# Patient Record
Sex: FEMALE | Race: WHITE | NOT HISPANIC OR LATINO | Employment: PART TIME | URBAN - METROPOLITAN AREA
[De-identification: names, ages, dates, MRNs, and addresses within clinical notes are randomized per-mention and may not be internally consistent; named-entity substitution may affect disease eponyms.]

---

## 2022-01-14 ENCOUNTER — TELEPHONE (OUTPATIENT)
Dept: SCHEDULING | Facility: IMAGING CENTER | Age: 62
End: 2022-01-14

## 2022-02-03 NOTE — PROGRESS NOTES
Subjective:     CC: Establish care    HISTORY OF THE PRESENT ILLNESS: Patient is a 62 y.o. female. This pleasant patient is here today to establish care and discuss the following issues:    The patient reports bilateral shoulder pain in the mornings and at night.  She works at an [employer] center and does a lot of repetitive activity.  She also reports a painful bump on her left heel over the last month.  She is required to wear steel toed boots at her job.  She also reports  discolorization and thickening of her big toe. This patient states she has smoked 1 pack/day x 43 years.  She began around age 17 or 18.  She is possibly interested in trying to quit, she has tried to stop smoking previously.      Allergies: Patient has no allergy information on record.    Current Outpatient Medications Ordered in Epic   Medication Sig Dispense Refill   • ciclopirox (PENLAC) 8 % solution Apply to affected nails and adjacent skin daily. Remove with alcohol every 7 days. 6.6 mL 3     No current [facility]-ordered facility-administered medications on file.       History reviewed. No pertinent past medical history.    Past Surgical History:   Procedure Laterality Date   • SALPINGO-OOPHORECTOMY, UNILATERAL Right 1985       Social History     Tobacco Use   • Smoking status: Current Every Day Smoker     Packs/day: 0.50   • Smokeless tobacco: Never Used   • Tobacco comment: 0.5-1 pack a day    Vaping Use   • Vaping Use: Never used   Substance Use Topics   • Alcohol use: Yes     Comment: occ.    • Drug use: Never       Social History     Social History Narrative   • Not on file       Family History   Problem Relation Age of Onset   • Rheumatologic Disease Mother    • Diabetes Father        Health Maintenance: Completed    ROS:   Gen: no fevers/chills  Pulm: no sob, no cough  CV: no chest pain, no palpitations  GI: no nausea/vomiting, no diarrhea  Neuro: no headaches, no numbness/tingling      Objective:     Exam: /68   Pulse  "71   Temp 36.4 °C (97.6 °F) (Temporal)   Resp 18   Ht 1.705 m (5' 7.13\")   Wt 101 kg (223 lb)   SpO2 93%  Body mass index is 34.8 kg/m².    General: Normal appearing. No distress.  HEENT: Normocephalic. Eyes conjunctiva clear lids without ptosis, pupils equal and reactive to light accommodation, oropharynx is without erythema, edema or exudates.   Pulmonary: Clear to ausculation.  Normal effort. No rales, ronchi, or wheezing.  Cardiovascular: Regular rate and rhythm without murmur.   Abdomen: Soft, nontender, nondistended.   Musculoskeletal: No extremity cyanosis, clubbing, or edema.  Psych: Normal mood and affect. Alert and oriented x3. Judgment and insight is normal.    Assessment & Plan:   62 y.o. female with the following -        Chronic pain of both shoulders  This is a chronic medical condition.  Ongoing.  The patient likely has chronic tendinitis due to the repetitive nature of her work.  She also may have a component of osteoarthritis, so we will check x-rays.  We will also screen for autoimmune arthritis.  - CCP ANTIBODY; Future  - RHEUMATOID ARTHRITIS FACTOR; Future  - DX-SHOULDER 2+ LEFT; Future  - DX-SHOULDER 2+ RIGHT; Future    Pain of right heel  This is a chronic medical condition.  Ongoing.  The patient reports a painful bump on her left heel.  The patient likely has a bone spur.  - DX-OS CALCIS (HEEL) 2+ RIGHT; Future  - Referral to Podiatry    Onychomycosis  This is a chronic medical condition.  The patient reports thickening and discoloration of her big toe.  Exam is consistent with onychomycoses.  - ciclopirox (PENLAC) 8 % solution; Apply to affected nails and adjacent skin daily. Remove with alcohol every 7 days.  Dispense: 6.6 mL; Refill: 3    Current smoker  This is a chronic medical condition.  Ongoing.  The patient states she has smoked 1 pack/day x 43 years.  She began around age 17 or 18.  She is possibly interested in trying to quit, she has tried to stop smoking previously.  We " will address this topic at her follow-up visit.  She will also be a candidate for the CT screening program.    Screening for deficiency anemia  - CBC WITH DIFFERENTIAL; Future    Encounter for screening for diabetes mellitus  - Comp Metabolic Panel; Future  - HEMOGLOBIN A1C; Future    Screening for cardiovascular condition  - Lipid Profile; Future    Thyroid disorder screen  - TSH WITH REFLEX TO FT4; Future    Encounter for screening mammogram for malignant neoplasm of breast  - MA-SCREENING MAMMO BILAT W/TOMOSYNTHESIS W/CAD; Future    Colon cancer screening  - COLOGUARD COLON CANCER SCREENING    Return in about 4 weeks (around 3/4/2022) for f/u labs.    Please note that this dictation was created using voice recognition software. I have made every reasonable attempt to correct obvious errors, but I expect that there are errors of grammar and possibly content that I did not discover before finalizing the note.

## 2022-02-04 ENCOUNTER — OFFICE VISIT (OUTPATIENT)
Dept: MEDICAL GROUP | Facility: PHYSICIAN GROUP | Age: 62
End: 2022-02-04
Payer: COMMERCIAL

## 2022-02-04 VITALS
TEMPERATURE: 97.6 F | WEIGHT: 223 LBS | DIASTOLIC BLOOD PRESSURE: 68 MMHG | BODY MASS INDEX: 35 KG/M2 | HEIGHT: 67 IN | RESPIRATION RATE: 18 BRPM | HEART RATE: 71 BPM | OXYGEN SATURATION: 93 % | SYSTOLIC BLOOD PRESSURE: 126 MMHG

## 2022-02-04 DIAGNOSIS — Z13.1 ENCOUNTER FOR SCREENING FOR DIABETES MELLITUS: ICD-10-CM

## 2022-02-04 DIAGNOSIS — G89.29 CHRONIC PAIN OF BOTH SHOULDERS: ICD-10-CM

## 2022-02-04 DIAGNOSIS — M25.512 CHRONIC PAIN OF BOTH SHOULDERS: ICD-10-CM

## 2022-02-04 DIAGNOSIS — Z13.29 THYROID DISORDER SCREEN: ICD-10-CM

## 2022-02-04 DIAGNOSIS — B35.1 ONYCHOMYCOSIS: ICD-10-CM

## 2022-02-04 DIAGNOSIS — Z13.0 SCREENING FOR DEFICIENCY ANEMIA: ICD-10-CM

## 2022-02-04 DIAGNOSIS — M79.671 PAIN OF RIGHT HEEL: ICD-10-CM

## 2022-02-04 DIAGNOSIS — Z12.31 ENCOUNTER FOR SCREENING MAMMOGRAM FOR MALIGNANT NEOPLASM OF BREAST: ICD-10-CM

## 2022-02-04 DIAGNOSIS — F17.200 CURRENT SMOKER: ICD-10-CM

## 2022-02-04 DIAGNOSIS — Z12.11 COLON CANCER SCREENING: ICD-10-CM

## 2022-02-04 DIAGNOSIS — Z13.6 SCREENING FOR CARDIOVASCULAR CONDITION: ICD-10-CM

## 2022-02-04 DIAGNOSIS — M25.511 CHRONIC PAIN OF BOTH SHOULDERS: ICD-10-CM

## 2022-02-04 PROCEDURE — 99204 OFFICE O/P NEW MOD 45 MIN: CPT | Performed by: INTERNAL MEDICINE

## 2022-02-04 RX ORDER — CICLOPIROX 80 MG/ML
SOLUTION TOPICAL
Qty: 6.6 ML | Refills: 3 | Status: SHIPPED | OUTPATIENT
Start: 2022-02-04

## 2022-02-04 ASSESSMENT — PATIENT HEALTH QUESTIONNAIRE - PHQ9: CLINICAL INTERPRETATION OF PHQ2 SCORE: 0

## 2022-02-08 PROBLEM — G89.29 CHRONIC PAIN OF BOTH SHOULDERS: Status: ACTIVE | Noted: 2022-02-08

## 2022-02-08 PROBLEM — B35.1 ONYCHOMYCOSIS: Status: ACTIVE | Noted: 2022-02-08

## 2022-02-08 PROBLEM — M25.512 CHRONIC PAIN OF BOTH SHOULDERS: Status: ACTIVE | Noted: 2022-02-08

## 2022-02-08 PROBLEM — F17.200 CURRENT SMOKER: Status: ACTIVE | Noted: 2022-02-08

## 2022-02-08 PROBLEM — M79.671 PAIN OF RIGHT HEEL: Status: ACTIVE | Noted: 2022-02-08

## 2022-02-08 PROBLEM — M25.511 CHRONIC PAIN OF BOTH SHOULDERS: Status: ACTIVE | Noted: 2022-02-08

## 2022-02-10 ENCOUNTER — HOSPITAL ENCOUNTER (OUTPATIENT)
Dept: LAB | Facility: MEDICAL CENTER | Age: 62
End: 2022-02-10
Attending: INTERNAL MEDICINE
Payer: COMMERCIAL

## 2022-02-10 ENCOUNTER — HOSPITAL ENCOUNTER (OUTPATIENT)
Dept: RADIOLOGY | Facility: MEDICAL CENTER | Age: 62
End: 2022-02-10
Attending: INTERNAL MEDICINE
Payer: COMMERCIAL

## 2022-02-10 DIAGNOSIS — M25.512 CHRONIC PAIN OF BOTH SHOULDERS: ICD-10-CM

## 2022-02-10 DIAGNOSIS — Z13.1 ENCOUNTER FOR SCREENING FOR DIABETES MELLITUS: ICD-10-CM

## 2022-02-10 DIAGNOSIS — Z13.29 THYROID DISORDER SCREEN: ICD-10-CM

## 2022-02-10 DIAGNOSIS — M25.511 CHRONIC PAIN OF BOTH SHOULDERS: ICD-10-CM

## 2022-02-10 DIAGNOSIS — G89.29 CHRONIC PAIN OF BOTH SHOULDERS: ICD-10-CM

## 2022-02-10 DIAGNOSIS — Z13.0 SCREENING FOR DEFICIENCY ANEMIA: ICD-10-CM

## 2022-02-10 DIAGNOSIS — Z13.6 SCREENING FOR CARDIOVASCULAR CONDITION: ICD-10-CM

## 2022-02-10 DIAGNOSIS — M79.671 PAIN OF RIGHT HEEL: ICD-10-CM

## 2022-02-10 LAB
ALBUMIN SERPL BCP-MCNC: 4.8 G/DL (ref 3.2–4.9)
ALBUMIN/GLOB SERPL: 1.8 G/DL
ALP SERPL-CCNC: 87 U/L (ref 30–99)
ALT SERPL-CCNC: 15 U/L (ref 2–50)
ANION GAP SERPL CALC-SCNC: 11 MMOL/L (ref 7–16)
AST SERPL-CCNC: 22 U/L (ref 12–45)
BASOPHILS # BLD AUTO: 1.2 % (ref 0–1.8)
BASOPHILS # BLD: 0.08 K/UL (ref 0–0.12)
BILIRUB SERPL-MCNC: 0.4 MG/DL (ref 0.1–1.5)
BUN SERPL-MCNC: 12 MG/DL (ref 8–22)
CALCIUM SERPL-MCNC: 9.5 MG/DL (ref 8.5–10.5)
CHLORIDE SERPL-SCNC: 103 MMOL/L (ref 96–112)
CHOLEST SERPL-MCNC: 220 MG/DL (ref 100–199)
CO2 SERPL-SCNC: 25 MMOL/L (ref 20–33)
CREAT SERPL-MCNC: 0.72 MG/DL (ref 0.5–1.4)
EOSINOPHIL # BLD AUTO: 0.28 K/UL (ref 0–0.51)
EOSINOPHIL NFR BLD: 4.2 % (ref 0–6.9)
ERYTHROCYTE [DISTWIDTH] IN BLOOD BY AUTOMATED COUNT: 43.3 FL (ref 35.9–50)
EST. AVERAGE GLUCOSE BLD GHB EST-MCNC: 126 MG/DL
FASTING STATUS PATIENT QL REPORTED: NORMAL
GLOBULIN SER CALC-MCNC: 2.7 G/DL (ref 1.9–3.5)
GLUCOSE SERPL-MCNC: 105 MG/DL (ref 65–99)
HBA1C MFR BLD: 6 % (ref 4–5.6)
HCT VFR BLD AUTO: 45.1 % (ref 37–47)
HDLC SERPL-MCNC: 40 MG/DL
HGB BLD-MCNC: 15 G/DL (ref 12–16)
IMM GRANULOCYTES # BLD AUTO: 0.01 K/UL (ref 0–0.11)
IMM GRANULOCYTES NFR BLD AUTO: 0.1 % (ref 0–0.9)
LDLC SERPL CALC-MCNC: 160 MG/DL
LYMPHOCYTES # BLD AUTO: 2.81 K/UL (ref 1–4.8)
LYMPHOCYTES NFR BLD: 41.9 % (ref 22–41)
MCH RBC QN AUTO: 30.3 PG (ref 27–33)
MCHC RBC AUTO-ENTMCNC: 33.3 G/DL (ref 33.6–35)
MCV RBC AUTO: 91.1 FL (ref 81.4–97.8)
MONOCYTES # BLD AUTO: 0.5 K/UL (ref 0–0.85)
MONOCYTES NFR BLD AUTO: 7.5 % (ref 0–13.4)
NEUTROPHILS # BLD AUTO: 3.03 K/UL (ref 2–7.15)
NEUTROPHILS NFR BLD: 45.1 % (ref 44–72)
NRBC # BLD AUTO: 0 K/UL
NRBC BLD-RTO: 0 /100 WBC
PLATELET # BLD AUTO: 327 K/UL (ref 164–446)
PMV BLD AUTO: 10.2 FL (ref 9–12.9)
POTASSIUM SERPL-SCNC: 4.1 MMOL/L (ref 3.6–5.5)
PROT SERPL-MCNC: 7.5 G/DL (ref 6–8.2)
RBC # BLD AUTO: 4.95 M/UL (ref 4.2–5.4)
RHEUMATOID FACT SER IA-ACNC: <10 IU/ML (ref 0–14)
SODIUM SERPL-SCNC: 139 MMOL/L (ref 135–145)
TRIGL SERPL-MCNC: 102 MG/DL (ref 0–149)
TSH SERPL DL<=0.005 MIU/L-ACNC: 1.68 UIU/ML (ref 0.38–5.33)
WBC # BLD AUTO: 6.7 K/UL (ref 4.8–10.8)

## 2022-02-10 PROCEDURE — 85025 COMPLETE CBC W/AUTO DIFF WBC: CPT

## 2022-02-10 PROCEDURE — 73030 X-RAY EXAM OF SHOULDER: CPT | Mod: LT

## 2022-02-10 PROCEDURE — 73650 X-RAY EXAM OF HEEL: CPT | Mod: RT

## 2022-02-10 PROCEDURE — 80061 LIPID PANEL: CPT

## 2022-02-10 PROCEDURE — 86200 CCP ANTIBODY: CPT

## 2022-02-10 PROCEDURE — 36415 COLL VENOUS BLD VENIPUNCTURE: CPT

## 2022-02-10 PROCEDURE — 83036 HEMOGLOBIN GLYCOSYLATED A1C: CPT

## 2022-02-10 PROCEDURE — 86431 RHEUMATOID FACTOR QUANT: CPT

## 2022-02-10 PROCEDURE — 73030 X-RAY EXAM OF SHOULDER: CPT | Mod: RT

## 2022-02-10 PROCEDURE — 80053 COMPREHEN METABOLIC PANEL: CPT

## 2022-02-10 PROCEDURE — 84443 ASSAY THYROID STIM HORMONE: CPT

## 2022-02-11 LAB — CCP IGG SERPL-ACNC: 3 UNITS (ref 0–19)

## 2022-03-03 ENCOUNTER — OFFICE VISIT (OUTPATIENT)
Dept: MEDICAL GROUP | Facility: PHYSICIAN GROUP | Age: 62
End: 2022-03-03
Payer: COMMERCIAL

## 2022-03-03 VITALS
OXYGEN SATURATION: 94 % | DIASTOLIC BLOOD PRESSURE: 78 MMHG | HEIGHT: 67 IN | SYSTOLIC BLOOD PRESSURE: 116 MMHG | WEIGHT: 224 LBS | HEART RATE: 60 BPM | TEMPERATURE: 96.8 F | BODY MASS INDEX: 35.16 KG/M2 | RESPIRATION RATE: 20 BRPM

## 2022-03-03 DIAGNOSIS — M25.511 CHRONIC PAIN OF BOTH SHOULDERS: ICD-10-CM

## 2022-03-03 DIAGNOSIS — R73.01 ELEVATED FASTING GLUCOSE: ICD-10-CM

## 2022-03-03 DIAGNOSIS — F17.200 CURRENT SMOKER: ICD-10-CM

## 2022-03-03 DIAGNOSIS — E78.2 MIXED HYPERLIPIDEMIA: ICD-10-CM

## 2022-03-03 DIAGNOSIS — M25.512 CHRONIC PAIN OF BOTH SHOULDERS: ICD-10-CM

## 2022-03-03 DIAGNOSIS — M79.641 PAIN IN BOTH HANDS: ICD-10-CM

## 2022-03-03 DIAGNOSIS — M79.642 PAIN IN BOTH HANDS: ICD-10-CM

## 2022-03-03 DIAGNOSIS — G89.29 CHRONIC PAIN OF BOTH SHOULDERS: ICD-10-CM

## 2022-03-03 DIAGNOSIS — M77.50 BONE SPUR OF FOOT: ICD-10-CM

## 2022-03-03 PROCEDURE — 99214 OFFICE O/P EST MOD 30 MIN: CPT | Performed by: INTERNAL MEDICINE

## 2022-03-03 ASSESSMENT — FIBROSIS 4 INDEX: FIB4 SCORE: 1.06

## 2022-03-03 NOTE — LETTER
March 3, 2022         Patient: Serenity Limon   YOB: 1960   Date of Visit: 3/3/2022           To Whom it May Concern:    Serenity Limon was seen in my clinic on 3/3/2022. She should be excused from the required composite toe work boots due to a medical condition that is worsened by wearing them.    If you have any questions or concerns, please don't hesitate to call.        Sincerely,           Rina Fernandez M.D.  Electronically Signed

## 2022-03-03 NOTE — PROGRESS NOTES
"Subjective:     CC:   Chief Complaint   Patient presents with   • Lab Results   • Results         HPI:   Serenity presents today to discuss the following issues:    The patient presents to follow-up on lab results.  She reports bilateral hand pain, but in particular, tenderness over her right second MCP joint with a palpable nodule.  She also reports continued heel pain, aggravated by her composite work boots.  She is requesting a letter excusing her from the requirement of wearing those boots.  She reports her shoulder pain is improving.    History reviewed. No pertinent past medical history.    Social History     Tobacco Use   • Smoking status: Current Every Day Smoker     Packs/day: 0.50   • Smokeless tobacco: Never Used   • Tobacco comment: 0.5-1 pack a day    Vaping Use   • Vaping Use: Never used   Substance Use Topics   • Alcohol use: Yes     Comment: occ.    • Drug use: Never       Current Outpatient Medications Ordered in Epic   Medication Sig Dispense Refill   • ciclopirox (PENLAC) 8 % solution Apply to affected nails and adjacent skin daily. Remove with alcohol every 7 days. 6.6 mL 3     No current [facility]-ordered facility-administered medications on file.       Allergies:  Patient has no allergy information on record.    Health Maintenance: Completed    ROS:   Denies any recent fevers or chills. No nausea or vomiting. No chest pains or shortness of breath.      Objective:       Exam:  /78 (BP Location: Left arm, Patient Position: Sitting, BP Cuff Size: Large adult)   Pulse 60   Temp 36 °C (96.8 °F) (Temporal)   Resp 20   Ht 1.702 m (5' 7\")   Wt 102 kg (224 lb)   SpO2 94%   Breastfeeding No   BMI 35.08 kg/m²  Body mass index is 35.08 kg/m².    Gen: Alert and oriented, No apparent distress.  Lungs: Normal effort, CTA bilaterally, no wheezes, rhonchi, or rales  CV: Regular rate and rhythm. No murmurs, rubs, or gallops.  Ext: TTP and palpable nodule over the right second MCP joint        Assessment & " Plan:     62 y.o. female with the following -     Pain in both hands  This is an acute medical condition.  The patient reports bilateral hand pain, but in particular, tenderness over her right second MCP joint with a palpable nodule, possibly consistent with a neuroma.  - DX-HAND 3+ RIGHT; Future  - DX-HAND 3+ LEFT; Future    Mixed hyperlipidemia  This is a chronic medical condition.  Stable.  The patient is diet controlled.  Lipid panel from 2/10/2022 showed a total cholesterol 220, , HDL 40, triglycerides 102. The 10-year ASCVD risk score (Vansant CORI Jr., et al., 2013) is: 9.4%  -Trial of dietary management with a low-cholesterol diet, repeat labs, treat if indicated  - Comp Metabolic Panel; Future  - Lipid Profile; Future    Elevated fasting glucose  This is a chronic medical condition.  Stable.  The patient is diet controlled.  Labs from 2/10/2022 showed an elevated fasting glucose of 105 with an elevated hemoglobin A1c of 6.  - Comp Metabolic Panel; Future  - HEMOGLOBIN A1C; Future    Current smoker  This is a chronic medical condition.  Ongoing.  The patient states she has smoked 1 pack/day x 43 years.  She began around age 17 or 18.  She is possibly interested in trying to quit, she has tried to stop smoking previously.  We will address this topic at her follow-up visit.  She will also be a candidate for the CT screening program.    Chronic pain of both shoulders  This is a chronic medical condition.  Improved.  The patient likely has chronic tendinitis due to the repetitive nature of her work.  Bilateral shoulder x-rays on 2/10/2022 showed osteoarthritis in the AC joint.  Rheumatoid factor was negative at < 10, CCP antibodies were 3.     Bone spur of foot  This is a chronic medical condition.  The patient has chronic and progressive right heel pain. Right heel x-ray on 2/10/2022 showed prominent calcaneal spurs.  The patient was referred to podiatry at our last visit, appointment pending.      Return in  about 3 months (around 6/3/2022) for f/u labs.    Please note that this dictation was created using voice recognition software. I have made every reasonable attempt to correct obvious errors, but I expect that there are errors of grammar and possibly content that I did not discover before finalizing the note.

## 2022-03-25 ENCOUNTER — HOSPITAL ENCOUNTER (OUTPATIENT)
Dept: RADIOLOGY | Facility: MEDICAL CENTER | Age: 62
End: 2022-03-25
Attending: INTERNAL MEDICINE
Payer: COMMERCIAL

## 2022-03-25 DIAGNOSIS — M79.641 PAIN IN BOTH HANDS: ICD-10-CM

## 2022-03-25 DIAGNOSIS — M79.642 PAIN IN BOTH HANDS: ICD-10-CM

## 2022-03-25 PROCEDURE — 73130 X-RAY EXAM OF HAND: CPT | Mod: RT

## 2022-03-25 PROCEDURE — 73130 X-RAY EXAM OF HAND: CPT | Mod: LT

## 2022-04-14 ENCOUNTER — HOSPITAL ENCOUNTER (OUTPATIENT)
Dept: RADIOLOGY | Facility: MEDICAL CENTER | Age: 62
End: 2022-04-14
Attending: INTERNAL MEDICINE
Payer: COMMERCIAL

## 2022-04-14 DIAGNOSIS — Z12.31 ENCOUNTER FOR SCREENING MAMMOGRAM FOR MALIGNANT NEOPLASM OF BREAST: ICD-10-CM

## 2022-04-14 PROCEDURE — 77063 BREAST TOMOSYNTHESIS BI: CPT

## 2022-04-22 ENCOUNTER — HOSPITAL ENCOUNTER (OUTPATIENT)
Dept: RADIOLOGY | Facility: MEDICAL CENTER | Age: 62
End: 2022-04-22
Attending: INTERNAL MEDICINE
Payer: COMMERCIAL

## 2022-04-22 DIAGNOSIS — R92.8 ABNORMAL MAMMOGRAM: ICD-10-CM

## 2022-04-22 PROCEDURE — 76642 ULTRASOUND BREAST LIMITED: CPT | Mod: RT

## 2022-04-22 PROCEDURE — G0279 TOMOSYNTHESIS, MAMMO: HCPCS

## 2022-07-28 ENCOUNTER — HOSPITAL ENCOUNTER (OUTPATIENT)
Dept: LAB | Facility: MEDICAL CENTER | Age: 62
End: 2022-07-28
Attending: INTERNAL MEDICINE
Payer: COMMERCIAL

## 2022-07-28 DIAGNOSIS — E78.2 MIXED HYPERLIPIDEMIA: ICD-10-CM

## 2022-07-28 DIAGNOSIS — R73.01 ELEVATED FASTING GLUCOSE: ICD-10-CM

## 2022-07-28 LAB
ALBUMIN SERPL BCP-MCNC: 4.3 G/DL (ref 3.2–4.9)
ALBUMIN/GLOB SERPL: 1.7 G/DL
ALP SERPL-CCNC: 79 U/L (ref 30–99)
ALT SERPL-CCNC: 11 U/L (ref 2–50)
ANION GAP SERPL CALC-SCNC: 11 MMOL/L (ref 7–16)
AST SERPL-CCNC: 16 U/L (ref 12–45)
BILIRUB SERPL-MCNC: 0.3 MG/DL (ref 0.1–1.5)
BUN SERPL-MCNC: 11 MG/DL (ref 8–22)
CALCIUM SERPL-MCNC: 8.9 MG/DL (ref 8.5–10.5)
CHLORIDE SERPL-SCNC: 102 MMOL/L (ref 96–112)
CHOLEST SERPL-MCNC: 217 MG/DL (ref 100–199)
CO2 SERPL-SCNC: 24 MMOL/L (ref 20–33)
CREAT SERPL-MCNC: 0.68 MG/DL (ref 0.5–1.4)
EST. AVERAGE GLUCOSE BLD GHB EST-MCNC: 120 MG/DL
FASTING STATUS PATIENT QL REPORTED: NORMAL
GFR SERPLBLD CREATININE-BSD FMLA CKD-EPI: 99 ML/MIN/1.73 M 2
GLOBULIN SER CALC-MCNC: 2.5 G/DL (ref 1.9–3.5)
GLUCOSE SERPL-MCNC: 83 MG/DL (ref 65–99)
HBA1C MFR BLD: 5.8 % (ref 4–5.6)
HDLC SERPL-MCNC: 43 MG/DL
LDLC SERPL CALC-MCNC: 157 MG/DL
POTASSIUM SERPL-SCNC: 4.3 MMOL/L (ref 3.6–5.5)
PROT SERPL-MCNC: 6.8 G/DL (ref 6–8.2)
SODIUM SERPL-SCNC: 137 MMOL/L (ref 135–145)
TRIGL SERPL-MCNC: 85 MG/DL (ref 0–149)

## 2022-07-28 PROCEDURE — 83036 HEMOGLOBIN GLYCOSYLATED A1C: CPT

## 2022-07-28 PROCEDURE — 36415 COLL VENOUS BLD VENIPUNCTURE: CPT

## 2022-07-28 PROCEDURE — 80061 LIPID PANEL: CPT

## 2022-07-28 PROCEDURE — 80053 COMPREHEN METABOLIC PANEL: CPT

## 2022-07-28 RX ORDER — METHYLPREDNISOLONE 4 MG/1
TABLET ORAL
COMMUNITY
Start: 2022-07-20 | End: 2022-08-02

## 2022-07-28 RX ORDER — CYCLOBENZAPRINE HCL 5 MG
5 TABLET ORAL
COMMUNITY
Start: 2022-05-10 | End: 2022-08-02

## 2022-07-28 RX ORDER — IBUPROFEN 800 MG/1
TABLET ORAL
COMMUNITY
Start: 2022-05-10 | End: 2022-08-02

## 2022-07-28 NOTE — PROGRESS NOTES
Subjective:     CC:   Chief Complaint   Patient presents with   • Lab Results       HPI:   Sreenity Limon is a 62-year-old female who presents to follow-up on lab results. The patient reports debilitating hot flashes over the last several years.  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  She needs to use a fan at night in order to be able to sleep.  She also is chronically eating ice.    History reviewed. No pertinent past medical history.    Social History     Tobacco Use   • Smoking status: Current Every Day Smoker     Packs/day: 0.50     Types: Cigarettes   • Smokeless tobacco: Never Used   • Tobacco comment: 0.5-1 pack a day    Vaping Use   • Vaping Use: Never used   Substance Use Topics   • Alcohol use: Yes     Comment: occ.    • Drug use: Never       Current Outpatient Medications Ordered in Epic   Medication Sig Dispense Refill   • venlafaxine XR (EFFEXOR XR) 37.5 MG CAPSULE SR 24 HR Take 1 Capsule by mouth every day. 30 Capsule 1   • ciclopirox (PENLAC) 8 % solution Apply to affected nails and adjacent skin daily. Remove with alcohol every 7 days. 6.6 mL 3   • cyclobenzaprine (FLEXERIL) 5 mg tablet Take 5 mg by mouth at bedtime. (Patient not taking: Reported on 7/29/2022)     • ibuprofen (MOTRIN) 800 MG Tab TAKE 1 TABLET BY MOUTH 3 TIMES DAILY AFTER MEALS (Patient not taking: Reported on 7/29/2022)     • methylPREDNISolone (MEDROL DOSEPAK) 4 MG Tablet Therapy Pack TAKE 6 TABLETS ON DAY 1 AS DIRECTED ON PACKAGE AND DECREASE BY 1 TAB EACH DAY FOR A TOTAL OF 6 DAYS (Patient not taking: Reported on 7/29/2022)       No current [facility]-ordered facility-administered medications on file.       Allergies:  Patient has no known allergies.    Health Maintenance: Completed    ROS:   Denies any recent fevers or chills. No nausea or vomiting. No chest pains or shortness of breath.      Objective:       Exam:  /74 (BP Location: Right arm, Patient Position: Sitting, BP  "Cuff Size: Adult long)   Pulse 74   Temp 36.5 °C (97.7 °F) (Temporal)   Ht 1.702 m (5' 7\")   Wt 103 kg (227 lb 8 oz)   SpO2 93%   BMI 35.63 kg/m²  Body mass index is 35.63 kg/m².    Gen: Alert and oriented, No apparent distress.  Lungs: Normal effort, CTA bilaterally, no wheezes, rhonchi, or rales  CV: Regular rate and rhythm. No murmurs, rubs, or gallops.  Ext: No clubbing, cyanosis, edema.        Assessment & Plan:     62 y.o. female with the following -     Hot flashes  Chronic medical condition.  Progressive.    · The patient reports debilitating hot flashes over the last several years.  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  She needs to use a fan at night in order to be able to sleep.  She also is chronically eating ice.  · We will check a number of labs to rule out causes other than menopause.  · Start trial of venlafaxine 37.5 mg SR daily, patient follow-up in 1 month.  - venlafaxine XR (EFFEXOR XR) 37.5 MG CAPSULE SR 24 HR; Take 1 Capsule by mouth every day.  Dispense: 30 Capsule; Refill: 1  - CBC WITH DIFFERENTIAL; Future  - TSH; Future  - FREE THYROXINE; Future  - T3 FREE; Future  - FSH/LH; Future  - ESTRADIOL; Future  - CORTISOL - AM  - TESTOSTERONE F&T FEMALES/CHILD; Future  - LDH; Future    Mixed hyperlipidemia  Chronic medical condition.  Stable.    · Diet controlled.    · Lipid panel from 7/28/2022 showed a total cholesterol 217, , HDL 43, triglycerides 85. The 10-year ASCVD risk score (Panda DC Jr., et al., 2013) is: 8.6%  · Continue dietary management with a low-cholesterol diet given the patient's borderline 10-year ASCVD risk score and preference to not be on medication.       Elevated fasting glucose  Chronic medical condition.  Stable.    · Diet controlled.    · Hemoglobin A1c from 7/28/2022 was elevated at 5.8, down from a value of 6.    Return in about 4 weeks (around 8/26/2022) for new medication.    Please note that this " dictation was created using voice recognition software. I have made every reasonable attempt to correct obvious errors, but I expect that there are errors of grammar and possibly content that I did not discover before finalizing the note.

## 2022-07-29 ENCOUNTER — HOSPITAL ENCOUNTER (OUTPATIENT)
Dept: LAB | Facility: MEDICAL CENTER | Age: 62
End: 2022-07-29
Attending: INTERNAL MEDICINE
Payer: COMMERCIAL

## 2022-07-29 ENCOUNTER — OFFICE VISIT (OUTPATIENT)
Dept: MEDICAL GROUP | Facility: PHYSICIAN GROUP | Age: 62
End: 2022-07-29
Payer: COMMERCIAL

## 2022-07-29 VITALS
BODY MASS INDEX: 35.71 KG/M2 | SYSTOLIC BLOOD PRESSURE: 124 MMHG | TEMPERATURE: 97.7 F | WEIGHT: 227.5 LBS | OXYGEN SATURATION: 93 % | HEIGHT: 67 IN | HEART RATE: 74 BPM | DIASTOLIC BLOOD PRESSURE: 74 MMHG

## 2022-07-29 DIAGNOSIS — R23.2 HOT FLASHES: ICD-10-CM

## 2022-07-29 DIAGNOSIS — R73.01 ELEVATED FASTING GLUCOSE: ICD-10-CM

## 2022-07-29 DIAGNOSIS — E78.2 MIXED HYPERLIPIDEMIA: ICD-10-CM

## 2022-07-29 LAB
BASOPHILS # BLD AUTO: 1 % (ref 0–1.8)
BASOPHILS # BLD: 0.07 K/UL (ref 0–0.12)
CORTIS SERPL-MCNC: 9.2 UG/DL (ref 0–23)
EOSINOPHIL # BLD AUTO: 0.22 K/UL (ref 0–0.51)
EOSINOPHIL NFR BLD: 3 % (ref 0–6.9)
ERYTHROCYTE [DISTWIDTH] IN BLOOD BY AUTOMATED COUNT: 45.3 FL (ref 35.9–50)
ESTRADIOL SERPL-MCNC: <5 PG/ML
FSH SERPL-ACNC: 39.4 MIU/ML
HCT VFR BLD AUTO: 46.7 % (ref 37–47)
HGB BLD-MCNC: 15.5 G/DL (ref 12–16)
IMM GRANULOCYTES # BLD AUTO: 0.04 K/UL (ref 0–0.11)
IMM GRANULOCYTES NFR BLD AUTO: 0.5 % (ref 0–0.9)
LDH SERPL L TO P-CCNC: 138 U/L (ref 107–266)
LH SERPL-ACNC: 23 IU/L
LYMPHOCYTES # BLD AUTO: 3.03 K/UL (ref 1–4.8)
LYMPHOCYTES NFR BLD: 41.2 % (ref 22–41)
MCH RBC QN AUTO: 30.7 PG (ref 27–33)
MCHC RBC AUTO-ENTMCNC: 33.2 G/DL (ref 33.6–35)
MCV RBC AUTO: 92.5 FL (ref 81.4–97.8)
MONOCYTES # BLD AUTO: 0.58 K/UL (ref 0–0.85)
MONOCYTES NFR BLD AUTO: 7.9 % (ref 0–13.4)
NEUTROPHILS # BLD AUTO: 3.41 K/UL (ref 2–7.15)
NEUTROPHILS NFR BLD: 46.4 % (ref 44–72)
NRBC # BLD AUTO: 0 K/UL
NRBC BLD-RTO: 0 /100 WBC
PLATELET # BLD AUTO: 280 K/UL (ref 164–446)
PMV BLD AUTO: 10.3 FL (ref 9–12.9)
RBC # BLD AUTO: 5.05 M/UL (ref 4.2–5.4)
T3FREE SERPL-MCNC: 2.96 PG/ML (ref 2–4.4)
T4 FREE SERPL-MCNC: 1.1 NG/DL (ref 0.93–1.7)
TSH SERPL DL<=0.005 MIU/L-ACNC: 1.7 UIU/ML (ref 0.38–5.33)
WBC # BLD AUTO: 7.4 K/UL (ref 4.8–10.8)

## 2022-07-29 PROCEDURE — 85025 COMPLETE CBC W/AUTO DIFF WBC: CPT

## 2022-07-29 PROCEDURE — 83615 LACTATE (LD) (LDH) ENZYME: CPT

## 2022-07-29 PROCEDURE — 82533 TOTAL CORTISOL: CPT

## 2022-07-29 PROCEDURE — 84270 ASSAY OF SEX HORMONE GLOBUL: CPT

## 2022-07-29 PROCEDURE — 84403 ASSAY OF TOTAL TESTOSTERONE: CPT

## 2022-07-29 PROCEDURE — 84402 ASSAY OF FREE TESTOSTERONE: CPT

## 2022-07-29 PROCEDURE — 36415 COLL VENOUS BLD VENIPUNCTURE: CPT

## 2022-07-29 PROCEDURE — 84443 ASSAY THYROID STIM HORMONE: CPT

## 2022-07-29 PROCEDURE — 99214 OFFICE O/P EST MOD 30 MIN: CPT | Performed by: INTERNAL MEDICINE

## 2022-07-29 PROCEDURE — 83002 ASSAY OF GONADOTROPIN (LH): CPT

## 2022-07-29 PROCEDURE — 82670 ASSAY OF TOTAL ESTRADIOL: CPT

## 2022-07-29 PROCEDURE — 84481 FREE ASSAY (FT-3): CPT

## 2022-07-29 PROCEDURE — 83001 ASSAY OF GONADOTROPIN (FSH): CPT

## 2022-07-29 PROCEDURE — 84439 ASSAY OF FREE THYROXINE: CPT

## 2022-07-29 RX ORDER — VENLAFAXINE HYDROCHLORIDE 37.5 MG/1
37.5 CAPSULE, EXTENDED RELEASE ORAL DAILY
Qty: 30 CAPSULE | Refills: 1 | Status: SHIPPED | OUTPATIENT
Start: 2022-07-29 | End: 2022-08-23

## 2022-07-29 ASSESSMENT — FIBROSIS 4 INDEX: FIB4 SCORE: 0.9

## 2022-08-11 LAB
SHBG SERPL-SCNC: 38 NMOL/L (ref 17–125)
TESTOST FREE SERPL-MCNC: 2.3 PG/ML (ref 0.6–3.8)
TESTOST SERPL-MCNC: 15 NG/DL (ref 5–32)

## 2022-12-20 ENCOUNTER — OFFICE VISIT (OUTPATIENT)
Dept: URGENT CARE | Facility: PHYSICIAN GROUP | Age: 62
End: 2022-12-20
Payer: COMMERCIAL

## 2022-12-20 VITALS
SYSTOLIC BLOOD PRESSURE: 126 MMHG | DIASTOLIC BLOOD PRESSURE: 84 MMHG | BODY MASS INDEX: 35.36 KG/M2 | OXYGEN SATURATION: 98 % | RESPIRATION RATE: 14 BRPM | HEART RATE: 85 BPM | WEIGHT: 220 LBS | TEMPERATURE: 97.4 F | HEIGHT: 66 IN

## 2022-12-20 DIAGNOSIS — R06.2 WHEEZING: ICD-10-CM

## 2022-12-20 DIAGNOSIS — R06.02 SHORTNESS OF BREATH: ICD-10-CM

## 2022-12-20 DIAGNOSIS — R05.1 ACUTE COUGH: ICD-10-CM

## 2022-12-20 PROCEDURE — 99213 OFFICE O/P EST LOW 20 MIN: CPT | Mod: 25 | Performed by: PHYSICIAN ASSISTANT

## 2022-12-20 PROCEDURE — 94640 AIRWAY INHALATION TREATMENT: CPT | Performed by: PHYSICIAN ASSISTANT

## 2022-12-20 RX ORDER — IPRATROPIUM BROMIDE AND ALBUTEROL SULFATE 2.5; .5 MG/3ML; MG/3ML
3 SOLUTION RESPIRATORY (INHALATION) ONCE
Status: COMPLETED | OUTPATIENT
Start: 2022-12-20 | End: 2022-12-20

## 2022-12-20 RX ORDER — BENZONATATE 100 MG/1
100 CAPSULE ORAL 3 TIMES DAILY PRN
Qty: 60 CAPSULE | Refills: 0 | Status: SHIPPED | OUTPATIENT
Start: 2022-12-20

## 2022-12-20 RX ORDER — PREDNISONE 10 MG/1
40 TABLET ORAL 2 TIMES DAILY
Qty: 40 TABLET | Refills: 0 | Status: SHIPPED | OUTPATIENT
Start: 2022-12-20 | End: 2022-12-25

## 2022-12-20 RX ADMIN — IPRATROPIUM BROMIDE AND ALBUTEROL SULFATE 3 ML: 2.5; .5 SOLUTION RESPIRATORY (INHALATION) at 09:16

## 2022-12-20 ASSESSMENT — ENCOUNTER SYMPTOMS
FEVER: 0
HEADACHES: 0
SORE THROAT: 0
SINUS PAIN: 0
WHEEZING: 1
DIAPHORESIS: 0
DIZZINESS: 0
COUGH: 1
VOMITING: 0
CHILLS: 0
ABDOMINAL PAIN: 0
EYE PAIN: 0
CONSTIPATION: 0
EYE REDNESS: 0
EYE DISCHARGE: 0
NAUSEA: 0
SHORTNESS OF BREATH: 1
DIARRHEA: 0

## 2022-12-20 ASSESSMENT — FIBROSIS 4 INDEX: FIB4 SCORE: 1.05

## 2022-12-20 NOTE — PROGRESS NOTES
"  Subjective:     Serenity Limon  is a 62 y.o. female who presents for Nasal Congestion (Cough, SOB x 4 days )       She presents today with cough and shortness of breath x4 days.  Shortness of breath is worsened with exertion.  There is some chest pain experienced during coughing episodes.  No history of asthma or COPD but has been a longstanding smoker.  Does not use any inhalers.  Notes multiple at home close sick contacts with similar respiratory symptoms for similar duration.  No fevers, no nausea or vomiting, no abdominal pain, no diarrhea.  Has taken DayQuil and NyQuil for symptoms.     Review of Systems   Constitutional:  Negative for chills, diaphoresis, fever and malaise/fatigue.   HENT:  Negative for congestion, ear discharge, sinus pain and sore throat.    Eyes:  Negative for pain, discharge and redness.   Respiratory:  Positive for cough, shortness of breath and wheezing.    Cardiovascular:  Negative for chest pain.   Gastrointestinal:  Negative for abdominal pain, constipation, diarrhea, nausea and vomiting.   Neurological:  Negative for dizziness and headaches.    No Known Allergies  History reviewed. No pertinent past medical history.     Objective:   /84   Pulse 85   Temp 36.3 °C (97.4 °F) (Temporal)   Resp 14   Ht 1.676 m (5' 6\")   Wt 99.8 kg (220 lb)   SpO2 98%   BMI 35.51 kg/m²   Physical Exam  Vitals and nursing note reviewed.   Constitutional:       General: She is not in acute distress.     Appearance: Normal appearance. She is not ill-appearing, toxic-appearing or diaphoretic.   HENT:      Head: Normocephalic.      Right Ear: Tympanic membrane, ear canal and external ear normal. There is no impacted cerumen.      Left Ear: Tympanic membrane, ear canal and external ear normal. There is no impacted cerumen.      Nose: No congestion or rhinorrhea.      Mouth/Throat:      Mouth: Mucous membranes are moist.      Pharynx: No oropharyngeal exudate or posterior oropharyngeal " erythema.   Eyes:      General:         Right eye: No discharge.         Left eye: No discharge.      Conjunctiva/sclera: Conjunctivae normal.   Cardiovascular:      Rate and Rhythm: Normal rate and regular rhythm.   Pulmonary:      Effort: Pulmonary effort is normal. No respiratory distress.      Breath sounds: No stridor. Wheezing present. No rhonchi.   Musculoskeletal:      Cervical back: Neck supple.   Lymphadenopathy:      Cervical: No cervical adenopathy.   Neurological:      General: No focal deficit present.      Mental Status: She is alert and oriented to person, place, and time.   Psychiatric:         Mood and Affect: Mood normal.         Behavior: Behavior normal.         Thought Content: Thought content normal.         Judgment: Judgment normal.           Diagnostic testing: None    Assessment/Plan:     Encounter Diagnoses   Name Primary?    Acute cough     Wheezing     Shortness of breath           Plan for care for today's complaint includes prednisone and Tessalon Perles for acute cough and wheezing.  Did provide DuoNeb treatment in office today, patient did have subjective improvements in her symptoms, lung auscultation following the DuoNeb treatment did see roughly 50% improvement in the wheezing.  Vital signs are stable and SPO2 is 98% on exam today.  Follow-up with primary care for further evaluation of possible COPD or emphysema as patient is a longstanding smoker.  Continue to monitor symptoms and return to urgent care or follow-up with primary care provider if symptoms remain ongoing.  Follow-up in the emergency department if symptoms become severe, ER precautions discussed in office today..  Prescription for prednisone, Tessalon Perles provided.    See AVS Instructions below for written guidance provided to patient on after-visit management and care in addition to our verbal discussion during the visit.    Please note that this dictation was created using voice recognition software. I have  attempted to correct all errors, but there may be sound-alike, spelling, grammar and possibly content errors that I did not discover before finalizing the note.    Weld Yazmin DINH